# Patient Record
Sex: FEMALE | Race: BLACK OR AFRICAN AMERICAN | NOT HISPANIC OR LATINO | Employment: FULL TIME | URBAN - METROPOLITAN AREA
[De-identification: names, ages, dates, MRNs, and addresses within clinical notes are randomized per-mention and may not be internally consistent; named-entity substitution may affect disease eponyms.]

---

## 2022-05-13 ENCOUNTER — TELEPHONE (OUTPATIENT)
Dept: ORTHOPEDIC SURGERY | Facility: CLINIC | Age: 52
End: 2022-05-13

## 2022-05-13 NOTE — TELEPHONE ENCOUNTER
Called patient about missed appointment 5/12, patient stated she had another appointment and wasn't able to make this appointment, patient rescheduled. I reminded patient about our 24 hour cancellation notice.

## 2022-05-20 ENCOUNTER — TELEPHONE (OUTPATIENT)
Dept: ORTHOPEDIC SURGERY | Facility: CLINIC | Age: 52
End: 2022-05-20

## 2022-05-20 NOTE — TELEPHONE ENCOUNTER
Called patient about missed appointment 5/12, LVM and reminded patient about our 24 hour cancellation notice

## 2022-12-12 ENCOUNTER — TRANSCRIBE ORDERS (OUTPATIENT)
Dept: PHYSICAL THERAPY | Facility: CLINIC | Age: 52
End: 2022-12-12

## 2022-12-12 DIAGNOSIS — S39.012A STRAIN OF MUSCLE, FASCIA AND TENDON OF LOWER BACK, INITIAL ENCOUNTER: ICD-10-CM

## 2022-12-12 DIAGNOSIS — S46.011A STRAIN OF MUSCLE(S) AND TENDON(S) OF THE ROTATOR CUFF OF RIGHT SHOULDER, INITIAL ENCOUNTER: Primary | ICD-10-CM

## 2022-12-16 ENCOUNTER — TELEPHONE (OUTPATIENT)
Dept: PHYSICAL THERAPY | Facility: CLINIC | Age: 52
End: 2022-12-16

## 2022-12-21 ENCOUNTER — TREATMENT (OUTPATIENT)
Dept: PHYSICAL THERAPY | Facility: CLINIC | Age: 52
End: 2022-12-21

## 2022-12-21 DIAGNOSIS — M25.511 ACUTE PAIN OF RIGHT SHOULDER: Primary | ICD-10-CM

## 2022-12-21 DIAGNOSIS — M54.50 ACUTE RIGHT-SIDED LOW BACK PAIN WITHOUT SCIATICA: ICD-10-CM

## 2022-12-21 DIAGNOSIS — R29.898 WEAKNESS OF RIGHT ARM: ICD-10-CM

## 2022-12-21 PROCEDURE — 97014 ELECTRIC STIMULATION THERAPY: CPT | Performed by: PHYSICAL THERAPIST

## 2022-12-21 PROCEDURE — 97530 THERAPEUTIC ACTIVITIES: CPT | Performed by: PHYSICAL THERAPIST

## 2022-12-21 PROCEDURE — 97110 THERAPEUTIC EXERCISES: CPT | Performed by: PHYSICAL THERAPIST

## 2022-12-21 PROCEDURE — 97162 PT EVAL MOD COMPLEX 30 MIN: CPT | Performed by: PHYSICAL THERAPIST

## 2022-12-21 NOTE — PROGRESS NOTES
Physical Therapy Initial Evaluation and Plan of Care             [address]    [place] [address]    Patient: Dulce Painting   : 1970  Diagnosis/ICD-10 Code:  Acute pain of right shoulder [M25.511]  Referring practitioner: LACEY Kenney  Date of Initial Visit: 2022  Today's Date: 2022  Patient seen for 1 session         Visit Diagnoses:    ICD-10-CM ICD-9-CM   1. Acute pain of right shoulder  M25.511 719.41   2. Weakness of right arm  R29.898 729.89   3. Acute right-sided low back pain without sciatica  M54.50 724.2         Subjective Questionnaire: QuickDASH: 59.09% impairment      Subjective Evaluation    History of Present Illness  Date of onset: 2022  Mechanism of injury: Injured R low back and R shoulder at work when she attempted to assist a patient that was falling. Twisted and felt something snap. Was able to complete her shift and felt ok, but the next day developed pn. Denies any pn radiating down her leg but is experiencing tingling in her fingers that wakes her during the night, occurs during the day as well. R  weak. Unable to raise her R arm overhead,  has to assist w/ donning/doffing bra. Denies pn in neck or difficulty moving her head. Shoulder pn minimal at rest, primarily occurs when moving her arm.  Back pn intermittent, worst w/ quick movements. Gets a cramping sensation in the R low back at times and has to stretch it out by bending to her L side. Has not had any imaging.     Subjective comment: R shoulder pn, R low back pn  Patient Occupation: CNA   Precautions and Work Restrictions: light duty- lifting/pushing/pulling no more than 5-7 lbsQuality of life: fair    Pain  Current pain rating: 3  At best pain ratin  At worst pain rating: 10  Location: back current 3/10, best 0/10, worst 10/10  Quality: dull ache, sharp and radiating  Relieving factors: heat, change in position and rest  Aggravating factors: lifting, movement, overhead  "activity, standing, sleeping, prolonged positioning, squatting, repetitive movement, outstretched reach and ambulation  Progression: no change    Hand dominance: right    Diagnostic Tests  No diagnostic tests performed    Treatments  No previous or current treatments  Patient Goals  Patient goals for therapy: decreased pain, increased motion, increased strength, independence with ADLs/IADLs, return to sport/leisure activities and return to work             Treatment  See Exercise, Manual, and Modality Logs for complete treatment.     Objective          Observations     Right Shoulder  Negative for atrophy, deformity and edema.       Tenderness     Additional Tenderness Details  TTP R UT, post cuff, anterolateral subacromial region  TTP R QL, mid/lower lumbar paraspinals, glut med/max    Neurological Testing     Sensation   Cervical/Thoracic     Right   Diminished: light touch    Comments   Right light touch: R lateral forearm, thumb/index finger.     Lumbar     Right   Intact: light touch    Active Range of Motion   Cervical/Thoracic Spine   Normal active range of motion  Left Shoulder   Flexion: 150 degrees   Abduction: 150 degrees   External rotation 0°: 70 degrees   Internal rotation BTB: T8     Right Shoulder   Flexion: 80 degrees with pain  Abduction: 0 degrees with pain  External rotation 0°: 25 degrees with pain  Internal rotation BTB: Active internal rotation behind the back: unable to tolerate. with pain    Additional Active Range of Motion Details  Reports inc tingling in RUE w/ ipsilateral cervical rotation  Trunk Flxn fingertips to mid shin, \"pulling\" from post shoulder to R glut  Trunk Extn WFL-reports back pn relief  Trunk Rotation L WNL w/o pn  R unable to tolerate 2/2 R LBP  Trunk SB L WFL w/o pn   R WFL w/ R LBP       Passive Range of Motion     Right Shoulder   Flexion: 70 degrees   Abduction: 40 degrees   External rotation 0°: 30 degrees   Internal rotation 45°: 20 degrees     Strength/Myotome " Testing     Right Shoulder     Planes of Motion   Flexion: 2   Abduction: 2-   External rotation at 0°: 3   Internal rotation at 0°: 3     Right Elbow   Flexion: 3+  Extension: 4-    Left Wrist/Hand      (2nd hand position)   Left  strength (2nd hand position) 45 lbs    Right Wrist/Hand   Wrist extension: 4+  Wrist flexion: 4+     (2nd hand position)   Right  strength (2nd hand position) 18 lbs    Left Hip   Planes of Motion   Flexion: 4    Right Hip   Planes of Motion   Flexion: 3+    Left Knee   Flexion: 5  Extension: 5    Right Knee   Flexion: 5  Extension: 5    Left Ankle/Foot   Dorsiflexion: 5  Plantar flexion: 5  Great toe extension: 5    Right Ankle/Foot   Dorsiflexion: 5  Plantar flexion: 5  Great toe extension: 5    Additional Strength Details  Reported severe pn in R shoulder w/  testing    Tests     Right Shoulder   Positive drop arm, Hawkin's and Speed's.   Negative belly press and external rotation lag sign. Lag sign at 0 degrees: unable to susi positioning for test.     Lumbar     Right   Positive quadrant.   Negative passive SLR.     Additional Tests Details  (+) compression, spurling, distraction            Assessment & Plan     Assessment  Impairments: abnormal or restricted ROM, activity intolerance, impaired physical strength, lacks appropriate home exercise program and pain with function  Functional Limitations: carrying objects, lifting, sleeping, walking, pulling, pushing, uncomfortable because of pain, moving in bed, stooping, reaching behind back, reaching overhead and unable to perform repetitive tasks  Assessment details: Pt is a 52 YOF who presents to PT w/ complaint of R shoulder and R low back pn after work injury sustained on 12/7/22 while assisting a pt who was falling. Sings/symptoms indicative of R lumbar strain, potential R rotator cuff tear, though cervical discogenic pn cannot be excluded given report of UE paresthesia that is altered w/ cervical mobility,  compression/distraction. She exhibits profound loss of active R shoulder mobility and strength, virtually unable to initiate abduction. Demo (+) drop arm, (-) rotation lag signs though testing overall difficult to interpret given significantly high pn level, intolerance to some special testing. R  weak. She exhibits loss of trunk mobility w/ ipsilateral rotation and SB, noting pn and cramping sensation over R QL/paraspinals. (-) LE radicular symptoms and responded well to low back stretching in clinic. Good relief of shoulder pn today w/ tens/MHP. Pt would benefit from skilled PT services to address deficits and allow return to full work and daily activities w/o pn or limitation.    Barriers to therapy: high resting pn level, symptom irritability  Prognosis: good    Goals  Plan Goals: STG 4 wks  1) Pt to be compliant w/ initial HEP for ROM, strength and symptom mgmt.  2) Pt to report resting shoulder/low back pn to be no greater than 5/10.  3) Pt to improve R shoulder AROM to 120 deg fwd elevation, 90 deg abd, 50 deg ER or better to assist w/ ADL and work activity performance.  4) Pt to improve trunk mobility to 50% R rotation, sidebending or better.  5) Pt to improve QD score to 40% impairment or better to reflect improved pn and function.    LTG 8 wks  1) Pt to be independent w/ long term HEP and self mgmt.  2) Pt to report min to no shoulder/back pn w/ ADLs and work activities.  3) Pt to improve R shoulder AROM to 150 deg elevation, 70 deg ER or better.  4) Pt to improve R shoulder strength to 4/5 or better in all planes.  5) Pt to demo restored trunk mobility in all planes w/ little to no pn.  6) Pt to improve QD score to 20% impairment or better to reflect improved pn and function.       Plan  Therapy options: will be seen for skilled therapy services  Planned modality interventions: TENS, cryotherapy, thermotherapy (hydrocollator packs), ultrasound and dry needling  Planned therapy interventions:  flexibility, functional ROM exercises, home exercise program, joint mobilization, manual therapy, neuromuscular re-education, postural training, soft tissue mobilization, strengthening, stretching and therapeutic activities  Frequency: 2x week  Duration in weeks: 12  Treatment plan discussed with: patient  Plan details: TE/TA/NMR/MT to address noted deficits, modalities as indicated for pn control        History # of Personal Factors and/or Comorbidities: LOW (0)  Examination of Body System(s): # of elements: MODERATE (3)  Clinical Presentation: EVOLVING  Clinical Decision Making: MODERATE      Timed:         Manual Therapy:    0     mins  40072;     Therapeutic Exercise:    10     mins  85519;     Neuromuscular Adam:    0    mins  40400;    Therapeutic Activity:     10     mins  83697;     Gait Trainin     mins  11059;     Ultrasound:     0     mins  44438;    Ionto                               0    mins   87432  Self Care                       0     mins   33538  Canalith Repos    0     mins 01436      Un-Timed:  Electrical Stimulation:    15     mins  11443 ( );  Dry Needling     0     mins self-pay  Traction     0     mins 93763  Low Eval     0     Mins  50444  Mod Eval     30     Mins  14152  High Eval                       0     Mins  27355        Timed Treatment:   20   mins   Total Treatment:     65   mins          PT: Diana See PT     [place] License: #[id number]    Electronically signed by Diana See, PT, 22, 1:05 PM EST    Certification Period: 2022 thru 3/20/2023  I certify that the therapy services are furnished while this patient is under my care.  The services outlined above are required by this patient, and will be reviewed every 90 days.         Physician Signature:__________________________________________________    PHYSICIAN: Sada, LACEY Alvarez      DATE:     Please sign and return via fax to [phone]. Thank you, [facility].

## 2022-12-28 ENCOUNTER — TREATMENT (OUTPATIENT)
Dept: PHYSICAL THERAPY | Facility: CLINIC | Age: 52
End: 2022-12-28

## 2022-12-28 DIAGNOSIS — M25.511 ACUTE PAIN OF RIGHT SHOULDER: Primary | ICD-10-CM

## 2022-12-28 DIAGNOSIS — M54.50 ACUTE RIGHT-SIDED LOW BACK PAIN WITHOUT SCIATICA: ICD-10-CM

## 2022-12-28 DIAGNOSIS — R29.898 WEAKNESS OF RIGHT ARM: ICD-10-CM

## 2022-12-28 PROCEDURE — 97530 THERAPEUTIC ACTIVITIES: CPT | Performed by: PHYSICAL THERAPIST

## 2022-12-28 PROCEDURE — 97140 MANUAL THERAPY 1/> REGIONS: CPT | Performed by: PHYSICAL THERAPIST

## 2022-12-28 PROCEDURE — 97110 THERAPEUTIC EXERCISES: CPT | Performed by: PHYSICAL THERAPIST

## 2022-12-28 NOTE — PROGRESS NOTES
"Physical Therapy Daily Treatment Note  [address] [facility] [address] [facility] [address]   [place] [address]      Patient: Dulce Painting   : 1970  Referring practitioner: LACEY Kenney  Date of Initial Visit: Type: THERAPY  Noted: 2022  Today's Date: 2022  Patient seen for 2 sessions       Visit Diagnoses:    ICD-10-CM ICD-9-CM   1. Acute pain of right shoulder  M25.511 719.41   2. Weakness of right arm  R29.898 729.89   3. Acute right-sided low back pain without sciatica  M54.50 724.2       Subjective   Dulce Painting reports: \"My arm is still tingling bad, and it's been popping a lot. It's better than where it was. The back is a different story. I can bend more but it hurts so bad.\"    Pre tx pn score: 8  Post tx pn score: 7    Treatment  See Exercise, Manual, and Modality Logs for complete treatment.     Objective     Right Shoulder   Flexion: 90 degrees with pain  Abduction: 65 degrees with pain  External rotation 0°: 75 degrees with pain  Internal rotation BTB: Active internal rotation behind the back: unable to tolerate. with pain    Assessment & Plan     Assessment    Assessment details: Pt compliant w/ initial HEP and subjectively reports some improvement in shoulder pn. Demonstrates gains in active shoulder mobility in all planes but IR BTB. Paresthesia persists, but nearly abolished w/ manual cervical traction and maintained for duration of visit. Focused on graded mobility exercise to improve tolerance to trunk ROM, posterior chain flexibility. No issues reported w/ hamstring stretching but all nearly other trunk movements painful even when performed in limited excursion. Had good susi to seated pelvic tilts so discussed adding to home. Encouraged continued PROM of the shoulder as instructed w/ HEP, avoiding excessive use of the R arm that exacerbates symptoms. She verbalized understanding. Pt needs continued PT to restore full strength, ROM, and function in order to allow return to full work and daily " activities w/o pn or dysfunction.      Plan  Plan details: Cont w/ focus on regaining trunk/shoulder mobility, reducing pn; may try NuStep for LE mobility          Timed:         Manual Therapy:    10     mins  59960;     Therapeutic Exercise:    25     mins  61105;     Neuromuscular Adam:    0    mins  26265;    Therapeutic Activity:     8     mins  27260;     Gait Trainin     mins  45757;     Ultrasound:     0     mins  29262;    Ionto                               0    mins   56568  Self Care                       0     mins   63530  Canalith Repos    0     mins 25645      Un-Timed:  Electrical Stimulation:    0     mins  23690 ( );  Dry Needling     0     mins self-pay  Traction     0     mins 64817      Timed Treatment:   43   mins   Total Treatment:     43   mins    Diana See, PT  [place] License: #[id number]

## 2023-01-05 ENCOUNTER — TREATMENT (OUTPATIENT)
Dept: PHYSICAL THERAPY | Facility: CLINIC | Age: 53
End: 2023-01-05
Payer: OTHER MISCELLANEOUS

## 2023-01-05 DIAGNOSIS — M25.511 ACUTE PAIN OF RIGHT SHOULDER: Primary | ICD-10-CM

## 2023-01-05 DIAGNOSIS — R29.898 WEAKNESS OF RIGHT ARM: ICD-10-CM

## 2023-01-05 DIAGNOSIS — M54.50 ACUTE RIGHT-SIDED LOW BACK PAIN WITHOUT SCIATICA: ICD-10-CM

## 2023-01-05 PROCEDURE — 97112 NEUROMUSCULAR REEDUCATION: CPT | Performed by: PHYSICAL THERAPIST

## 2023-01-05 PROCEDURE — 97530 THERAPEUTIC ACTIVITIES: CPT | Performed by: PHYSICAL THERAPIST

## 2023-01-05 PROCEDURE — 97110 THERAPEUTIC EXERCISES: CPT | Performed by: PHYSICAL THERAPIST

## 2023-01-05 NOTE — PROGRESS NOTES
Physical Therapy Daily Treatment Note  [address] [facility]  [address]   [place] [address]      Patient: Dulce Painting   : 1970  Referring practitioner: LACEY Kenney  Date of Initial Visit: Type: THERAPY  Noted: 2022  Today's Date: 2023  Patient seen for 3 sessions       Visit Diagnoses:    ICD-10-CM ICD-9-CM   1. Acute pain of right shoulder  M25.511 719.41   2. Weakness of right arm  R29.898 729.89   3. Acute right-sided low back pain without sciatica  M54.50 724.2       Subjective   Dulce Painting reports: \"I can move my arm behind my back a little more but it's still painful. My shoulder has been popping a lot and my arm is still tingling off and on. My back seems to be doing better.\"    Pre tx pn score: 6  Post tx pn score: 6    Treatment  See Exercise, Manual, and Modality Logs for complete treatment.     Objective     Active shoulder elevation approx 90 deg    Assessment & Plan     Assessment    Assessment details: Cont to c/o severe R shoulder pn, popping w/ mobility, and intermittent tingling down the arm to level of wrist. No lasting relief w/ cervical traction. Symptoms concerning for cuff tear. Bicep contraction palpable, (-) sixto deformity. Very TTP in region of subscapularis and reports axillary and lateral shoulder pn w/ virtually all shoulder exercise. Has minimal discomfort or limitation w/ active ER. Elevation ROM unchanged. Back symptoms minimal, managed w/ stretching. Pt to f/u w/ referring provider Monday regarding ongoing R shoulder pn.    Plan  Plan details: Cont per MD recommendation          Timed:         Manual Therapy:    0     mins  98536;     Therapeutic Exercise:    15     mins  49290;     Neuromuscular Adam:    8    mins  88931;    Therapeutic Activity:     10     mins  87464;     Gait Trainin     mins  93478;     Ultrasound:     0     mins  72774;    Ionto                               0    mins   04630  Self Care                       0     mins    15439  Canalith Repos    0     mins 50229      Un-Timed:  Electrical Stimulation:    0     mins  92926 ( );  Dry Needling     0     mins self-pay  Traction     0     mins 19992      Timed Treatment:   33   mins   Total Treatment:     33   mins    Diana See, PT  [place] License: #[id number]

## 2023-01-23 ENCOUNTER — TELEPHONE (OUTPATIENT)
Dept: PHYSICAL THERAPY | Facility: OTHER | Age: 53
End: 2023-01-23

## 2023-01-30 ENCOUNTER — TREATMENT (OUTPATIENT)
Dept: PHYSICAL THERAPY | Facility: CLINIC | Age: 53
End: 2023-01-30
Payer: OTHER MISCELLANEOUS

## 2023-01-30 DIAGNOSIS — R29.898 WEAKNESS OF RIGHT ARM: ICD-10-CM

## 2023-01-30 DIAGNOSIS — M54.50 ACUTE RIGHT-SIDED LOW BACK PAIN WITHOUT SCIATICA: ICD-10-CM

## 2023-01-30 DIAGNOSIS — M25.511 ACUTE PAIN OF RIGHT SHOULDER: Primary | ICD-10-CM

## 2023-01-30 PROCEDURE — 97530 THERAPEUTIC ACTIVITIES: CPT | Performed by: PHYSICAL THERAPIST

## 2023-01-30 PROCEDURE — 97110 THERAPEUTIC EXERCISES: CPT | Performed by: PHYSICAL THERAPIST

## 2023-01-30 PROCEDURE — 97112 NEUROMUSCULAR REEDUCATION: CPT | Performed by: PHYSICAL THERAPIST

## 2023-01-30 NOTE — PROGRESS NOTES
Physical Therapy Reassessment  [address] [facility] [address]    [place] [address]  Patient: Dulce Painting   : 1970  Diagnosis/ICD-10 Code:  Acute pain of right shoulder [M25.511]  Referring practitioner: LACEY Kenney  Date of Initial Visit: 2023  Today's Date: 2023  Patient seen for 4 sessions         Visit Diagnoses:    ICD-10-CM ICD-9-CM   1. Acute pain of right shoulder  M25.511 719.41   2. Weakness of right arm  R29.898 729.89   3. Acute right-sided low back pain without sciatica  M54.50 724.2       Subjective Questionnaire: QuickDASH: 56.82% impairment  Clinical Progress: improved  Home Program Compliance: Yes  Treatment has included: therapeutic exercise, neuromuscular re-education, manual therapy and therapeutic activity      Subjective   Dulce Painting reports: Pn in shoulder much better. Virtually no pn at rest but still painful to move arm overhead or attempt reaching behind her back. Fastening bra difficult. Tingling present throughout RUE off/on throughout the day, random in onset. Back is doing fairly well. Toward the end of the day can be difficult to move but tolerates modified work duties ok. Still limiting bending and lifting more than 10 lbs .Follows up w/ MD again this Friday.     Pre tx pn score: 0  Post tx pn score: 2    Treatment  See Exercise, Manual, and Modality Logs for complete treatment.     Objective          Observations     Right Shoulder  Negative for atrophy, deformity and edema.       Tenderness     Additional Tenderness Details  TTP post cuff, subscap, ant GHJ  TTP R QL, mid/lower lumbar paraspinals    Neurological Testing     Sensation   Cervical/Thoracic     Right   Diminished: light touch    Comments   Right light touch: R lateral forearm, thumb/index finger.     Lumbar     Right   Intact: light touch    Active Range of Motion   Cervical/Thoracic Spine   Normal active range of motion  Left Shoulder   Flexion: 150 degrees   Abduction: 150 degrees   External  rotation 0°: 70 degrees   Internal rotation BTB: T8     Right Shoulder   Flexion: 140 degrees with pain  Abduction: 80 degrees with pain  External rotation 0°: 75 degrees with pain  Internal rotation BTB: L1 with pain    Additional Active Range of Motion Details  Trunk Flxn fingertips to distal shin w/o pn  Trunk Extn WFL-reports back pn relief  Trunk Rotation WNL bilaterally w/o pn; mild contralateral low back tightness to L>R  Trunk SB WNL bilaterally w/ midline LBP at end range R SB      Passive Range of Motion     Right Shoulder   Flexion: 140 degrees   Abduction: 90 degrees   External rotation 0°: 80 degrees   External rotation 45°: 30 degrees   Internal rotation 45°: 45 degrees     Additional Passive Range of Motion Details  All PROM pn limited    Strength/Myotome Testing     Right Shoulder     Planes of Motion   Flexion: 3-   Abduction: 2+   External rotation at 0°: 4-   Internal rotation at 0°: 3+     Right Elbow   Flexion: 3+  Extension: 4-    Left Wrist/Hand      (2nd hand position)   Left  strength (2nd hand position) 45 lbs    Right Wrist/Hand   Wrist extension: 4+  Wrist flexion: 4+     (2nd hand position)   Right  strength (2nd hand position) 18 lbs    Left Hip   Planes of Motion   Flexion: 4    Right Hip   Planes of Motion   Flexion: 3+    Left Knee   Flexion: 5  Extension: 5    Right Knee   Flexion: 5  Extension: 5    Left Ankle/Foot   Dorsiflexion: 5  Plantar flexion: 5  Great toe extension: 5    Right Ankle/Foot   Dorsiflexion: 5  Plantar flexion: 5  Great toe extension: 5    Additional Strength Details  Reported severe pn in R shoulder w/  testing    Tests     Right Shoulder   Positive drop arm, Hawkin's and Speed's.   Negative belly press and external rotation lag sign. Lag sign at 0 degrees: unable to susi positioning for test.     Lumbar     Right   Positive quadrant.   Negative passive SLR.             Assessment & Plan     Assessment    Assessment details: Reassessment  performed. Pt has completed 4 PT visits. Demo significant gains in L R shoulder mobility, trunk mobility, smaller gains in R shoulder strength. Overall shoulder and back pn improved. Chief complaints at this time are weak and painful R shoulder mobility, intermittent paresthesia of the RUE that limit ability to perform work and daily activities. Findings remain concerning w/ cuff injury. Follows up w/ referring provider at end of the week. May benefit from ongoing PT to restore full strength, ROM, and function in order to allow return to full work and daily activities w/o pn or dysfunction.      Goals  Plan Goals: STG 4 wks  1) Pt to be compliant w/ initial HEP for ROM, strength and symptom mgmt.-MET  2) Pt to report resting shoulder/low back pn to be no greater than 5/10.-MET  3) Pt to improve R shoulder AROM to 120 deg fwd elevation, 90 deg abd, 50 deg ER or better to assist w/ ADL and work activity performance.-PARTIALLY MET  4) Pt to improve trunk mobility to 50% R rotation, sidebending or better.-MET  5) Pt to improve QD score to 40% impairment or better to reflect improved pn and function.-ONGOING    LTG 8 wks  1) Pt to be independent w/ long term HEP and self mgmt.-ONGOING  2) Pt to report min to no shoulder/back pn w/ ADLs and work activities.-ONGOING  3) Pt to improve R shoulder AROM to 150 deg elevation, 70 deg ER or better.-PROGRESSING  4) Pt to improve R shoulder strength to 4/5 or better in all planes.-PROGRESSING  5) Pt to demo restored trunk mobility in all planes w/ little to no pn.-PARTIALLY MET  6) Pt to improve QD score to 20% impairment or better to reflect improved pn and function. -ONGOING    Plan  Plan details: Cont w/ focus on restoration of R shoulder mobility, cuff/scapular function, low back stretching, core stab as tolerated         Progress toward previous goals: Partially Met        Timed:         Manual Therapy:    0     mins  43874;     Therapeutic Exercise:    15     mins  86055;      Neuromuscular Adam:    10    mins  24471;    Therapeutic Activity:     20     mins  45021;     Gait Trainin     mins  09404;     Ultrasound:     0     mins  38325;    Ionto                               0    mins   06710  Self Care                       0     mins   44611  Canalith Repos    0     mins 61912      Un-Timed:  Electrical Stimulation:    0     mins  31178 ( );  Dry Needling     0     mins self-pay  Traction     0     mins 49388  Re-Eval                           0    mins  48809      Timed Treatment:   45   mins   Total Treatment:     45   mins          PT: Diana See PT     [place] License: #[id number]    Electronically signed by Diana See, PT, 23, 3:23 PM EST

## 2023-01-31 NOTE — PATIENT INSTRUCTIONS
Access Code: [email or web address]  URL: [email or web address]  Date: 01/31/2023  Prepared by: Diana See    Exercises  Standing Shoulder and Trunk Flexion at Table - 1-2 x daily - 1-2 sets - 10 reps  Standing Single Arm Elbow Flexion with Resistance - 1-2 x daily - 1-2 sets - 10 reps  Seated Shoulder Flexion Towel Slide at Table Top - 1-2 x daily - 1-2 sets - 10 reps  Shoulder External Rotation and Scapular Retraction with Resistance - 1-2 x daily - 1-2 sets - 10 reps  Standing Lumbar Extension - 1-2 x daily - 1-2 sets - 10 reps  Supine Lower Trunk Rotation - 1-2 x daily - 5 reps - 10 sec hold

## 2023-02-06 ENCOUNTER — TREATMENT (OUTPATIENT)
Dept: PHYSICAL THERAPY | Facility: CLINIC | Age: 53
End: 2023-02-06
Payer: OTHER MISCELLANEOUS

## 2023-02-06 DIAGNOSIS — R29.898 WEAKNESS OF RIGHT ARM: ICD-10-CM

## 2023-02-06 DIAGNOSIS — M25.511 ACUTE PAIN OF RIGHT SHOULDER: Primary | ICD-10-CM

## 2023-02-06 DIAGNOSIS — M54.50 ACUTE RIGHT-SIDED LOW BACK PAIN WITHOUT SCIATICA: ICD-10-CM

## 2023-02-06 PROCEDURE — 97110 THERAPEUTIC EXERCISES: CPT | Performed by: PHYSICAL THERAPIST

## 2023-02-06 NOTE — PROGRESS NOTES
Physical Therapy Daily Treatment Note                  [address] [facility] [address]  [place] [address]      Patient: Dulce Painting   : 1970  Diagnosis/ICD-10 Code:  Acute pain of right shoulder [M25.511]  Referring practitioner: No ref. provider found  Date of Initial Visit: Type: THERAPY  Noted: 2022  Today's Date: 2023  Patient seen for 5 sessions             Subjective   Dulce Painting reports: felt like she was doing better with the exercises given last visit but then overdid it at home cleaning windows and other tasks. The shoulder has felt very tired, painful and tingling at times. Got the days wrong and follow up with MD is tomorrow.     Objective   See Exercise, Manual, and Modality Logs for complete treatment.       Assessment/Plan  N/T symptoms come on easily with mobility exercises unless given rest breaks. Follow up with MD tomorrow for further consultation on her care.   Progress per Plan of Care, Progress strengthening /stabilization /functional activity and Awaiting MD orders           Timed:  Manual Therapy:         mins  43162;  Therapeutic Exercise:    30     mins  94759;     Neuromuscular Adam:        mins  76579;    Therapeutic Activity:          mins  67345;     Gait Training:           mins  15421;     Ultrasound:          mins  30016;    Electrical Stimulation:         mins  78633;  Iontophoresis         mins  29183    Untimed:  Electrical Stimulation:         mins  45106 ( );  Mechanical Traction:         mins  30380;   Fluidotherapy          mins  19194    Timed Treatment:   30   mins   Total Treatment:     30   mins        Diana Oropeza PTA  Physical Therapist Assistant

## 2023-02-09 ENCOUNTER — TRANSCRIBE ORDERS (OUTPATIENT)
Dept: ADMINISTRATIVE | Facility: HOSPITAL | Age: 53
End: 2023-02-09
Payer: COMMERCIAL

## 2023-02-09 DIAGNOSIS — S46.011A STRAIN OF MUSCLE(S) AND TENDON(S) OF THE ROTATOR CUFF OF RIGHT SHOULDER, INITIAL ENCOUNTER: Primary | ICD-10-CM

## 2023-02-13 ENCOUNTER — TREATMENT (OUTPATIENT)
Dept: PHYSICAL THERAPY | Facility: CLINIC | Age: 53
End: 2023-02-13
Payer: OTHER MISCELLANEOUS

## 2023-02-13 DIAGNOSIS — M54.50 ACUTE RIGHT-SIDED LOW BACK PAIN WITHOUT SCIATICA: ICD-10-CM

## 2023-02-13 DIAGNOSIS — R29.898 WEAKNESS OF RIGHT ARM: ICD-10-CM

## 2023-02-13 DIAGNOSIS — M25.511 ACUTE PAIN OF RIGHT SHOULDER: Primary | ICD-10-CM

## 2023-02-13 PROCEDURE — 97014 ELECTRIC STIMULATION THERAPY: CPT | Performed by: PHYSICAL THERAPIST

## 2023-02-13 PROCEDURE — 97110 THERAPEUTIC EXERCISES: CPT | Performed by: PHYSICAL THERAPIST

## 2023-02-13 NOTE — PROGRESS NOTES
Physical Therapy Daily Treatment Note                  [address] [facility] [address]  [place] [address]      Patient: Dulce Painting   : 1970  Diagnosis/ICD-10 Code:  Acute pain of right shoulder [M25.511]  Referring practitioner: No ref. provider found  Date of Initial Visit: Type: THERAPY  Noted: 2022  Today's Date: 2023  Patient seen for 6 sessions             Subjective   Dulce Painting reports: still not lifting a lot due to restrictions. Really hurting in the back of the shoulder blade today with increase tingling. Thinks she slept on her shoulder wrong last night. MRI is scheduled over a month out and doesn't know if she wants to wait that long.     Objective   TENDER/TRIGGER POINT: R infraspinatus  See Exercise, Manual, and Modality Logs for complete treatment.       Assessment/Plan  Pt was apprehensive with R shoulder blade and could not handle any type of STM today. Completed modalities at end, which did decrease tingling but minimal change on pain. Pt may benefit from manual treatment next visit if still irritated from lying on R shoulder.   Progress per Plan of Care and Progress strengthening /stabilization /functional activity           Timed:  Manual Therapy:         mins  92477;  Therapeutic Exercise:    30     mins  39709;     Neuromuscular Adam:        mins  03802;    Therapeutic Activity:          mins  18972;     Gait Training:           mins  92016;     Ultrasound:          mins  56155;    Electrical Stimulation:         mins  26942;  Iontophoresis          mins  20751    Untimed:  Electrical Stimulation:    15     mins  74261 ( );  Mechanical Traction:         mins  71731;   Fluidotherapy          mins  79013    Timed Treatment:   30   mins   Total Treatment:     45   mins        Diana Oropeza PTA  Physical Therapist Assistant

## 2023-02-15 ENCOUNTER — TREATMENT (OUTPATIENT)
Dept: PHYSICAL THERAPY | Facility: CLINIC | Age: 53
End: 2023-02-15
Payer: OTHER MISCELLANEOUS

## 2023-02-15 DIAGNOSIS — M25.511 ACUTE PAIN OF RIGHT SHOULDER: Primary | ICD-10-CM

## 2023-02-15 DIAGNOSIS — M54.50 ACUTE RIGHT-SIDED LOW BACK PAIN WITHOUT SCIATICA: ICD-10-CM

## 2023-02-15 DIAGNOSIS — R29.898 WEAKNESS OF RIGHT ARM: ICD-10-CM

## 2023-02-15 PROCEDURE — 97035 APP MDLTY 1+ULTRASOUND EA 15: CPT | Performed by: PHYSICAL THERAPIST

## 2023-02-15 PROCEDURE — 97014 ELECTRIC STIMULATION THERAPY: CPT | Performed by: PHYSICAL THERAPIST

## 2023-02-15 PROCEDURE — 97140 MANUAL THERAPY 1/> REGIONS: CPT | Performed by: PHYSICAL THERAPIST

## 2023-02-15 NOTE — PROGRESS NOTES
Physical Therapy Daily Treatment Note                  [address] [facility] [address]  [place] [address]      Patient: Dulce Painting   : 1970  Diagnosis/ICD-10 Code:  Acute pain of right shoulder [M25.511]  Referring practitioner: No ref. provider found  Date of Initial Visit: Type: THERAPY  Noted: 2022  Today's Date: 2/15/2023  Patient seen for 7 sessions             Subjective   Dulce Painting reports: the back pan is pretty much resolved. The right shoulder is not as painful as last visit but the tingling is present down the arm to the hand. Unable to get MRI until approved.     Objective          Palpation     Right   Hypertonic in the infraspinatus. Tenderness of the infraspinatus.   Trigger point to infraspinatus.      See Exercise, Manual, and Modality Logs for complete treatment.       Assessment/Plan  Multiple trigger points that reproduced tingling in arm when palpating the infraspinatus. She was able to tolerate light pressure to massage these points that followed a decrease in radicular symptoms. Tingling centralized to R shoulder blade and minimal pain complaints. Ended with modalities to settle the symptoms today instead of stretching/strengthening. Hope to add this back in next visit.   Progress per Plan of Care and Progress strengthening /stabilization /functional activity           Timed:  Manual Therapy:    14     mins  41438;  Therapeutic Exercise:         mins  57665;     Neuromuscular Adam:        mins  81420;    Therapeutic Activity:          mins  93506;     Gait Training:           mins  63138;     Ultrasound:     10     mins  49521;    Electrical Stimulation:         mins  28087;  Iontophoresis          mins  34244    Untimed:  Electrical Stimulation:    20     mins  43736 ( );  Mechanical Traction:         mins  84572;   Fluidotherapy          mins  98058    Timed Treatment:   24   mins   Total Treatment:     44   mins        Diana Oropeza  PTA  Physical Therapist Assistant

## 2023-02-20 ENCOUNTER — TELEPHONE (OUTPATIENT)
Dept: PHYSICAL THERAPY | Facility: CLINIC | Age: 53
End: 2023-02-20

## 2023-02-22 ENCOUNTER — TREATMENT (OUTPATIENT)
Dept: PHYSICAL THERAPY | Facility: CLINIC | Age: 53
End: 2023-02-22
Payer: OTHER MISCELLANEOUS

## 2023-02-22 DIAGNOSIS — M54.50 ACUTE RIGHT-SIDED LOW BACK PAIN WITHOUT SCIATICA: ICD-10-CM

## 2023-02-22 DIAGNOSIS — M25.511 ACUTE PAIN OF RIGHT SHOULDER: Primary | ICD-10-CM

## 2023-02-22 DIAGNOSIS — R29.898 WEAKNESS OF RIGHT ARM: ICD-10-CM

## 2023-02-22 PROCEDURE — 97140 MANUAL THERAPY 1/> REGIONS: CPT | Performed by: PHYSICAL THERAPIST

## 2023-02-22 PROCEDURE — 97110 THERAPEUTIC EXERCISES: CPT | Performed by: PHYSICAL THERAPIST

## 2023-02-22 PROCEDURE — 97112 NEUROMUSCULAR REEDUCATION: CPT | Performed by: PHYSICAL THERAPIST

## 2023-02-22 PROCEDURE — 97530 THERAPEUTIC ACTIVITIES: CPT | Performed by: PHYSICAL THERAPIST

## 2023-02-22 NOTE — PROGRESS NOTES
"Physical Therapy Reassessment  [address] [facility] [address]    [place] [address]  Patient: Dulce Painting   : 1970  Diagnosis/ICD-10 Code:  Acute pain of right shoulder [M25.511]  Referring practitioner: No ref. provider found  Date of Initial Visit: 2023  Today's Date: 2023  Patient seen for 8 sessions         Visit Diagnoses:    ICD-10-CM ICD-9-CM   1. Acute pain of right shoulder  M25.511 719.41   2. Weakness of right arm  R29.898 729.89   3. Acute right-sided low back pain without sciatica  M54.50 724.2       Subjective Questionnaire: QuickDASH: 72.73% impairment  Clinical Progress: improved  Home Program Compliance: Yes  Treatment has included: therapeutic exercise, neuromuscular re-education, manual therapy, therapeutic activity, electrical stimulation, ultrasound, moist heat and cryotherapy      Subjective   Dulce Painting reports: Reports some improvement in tingling, not as constant. Pn diminished some. ROM improving but arm feels weak. Shoulder pn primarily posterior. \"My back is doing excellent. It doesn't hurt me to bend. I don't do anything fast.\"    Pre tx pn score: 2  Post tx pn score: 1    Treatment  See Exercise, Manual, and Modality Logs for complete treatment.     Objective          Observations     Right Shoulder  Negative for atrophy, deformity and edema.       Tenderness     Additional Tenderness Details  TTP supra/infraspinatus  No TTP lumbar spine    Neurological Testing     Sensation   Cervical/Thoracic     Right   Diminished: light touch    Comments   Right light touch: R lateral forearm, thumb/index finger.     Lumbar     Right   Intact: light touch    Active Range of Motion   Cervical/Thoracic Spine   Normal active range of motion  Left Shoulder   Flexion: 150 degrees   Abduction: 150 degrees   External rotation 0°: 70 degrees   Internal rotation BTB: T8     Right Shoulder   Flexion: 128 degrees with pain  Abduction: 115 degrees with pain  External rotation 0°: 90 degrees "   Internal rotation BTB: T11 with pain    Additional Active Range of Motion Details  Inc tingling w/ ER to end range  Trunk mobility WFL and pn free      Passive Range of Motion     Right Shoulder   Flexion: 150 degrees   Abduction: 120 degrees   External rotation 0°: 90 degrees   External rotation 45°: 30 degrees   Internal rotation 45°: 45 degrees     Additional Passive Range of Motion Details  All PROM pn limited    Strength/Myotome Testing     Right Shoulder     Planes of Motion   Flexion: 4- (in available ROM)   Abduction: 3+ (in available ROM)   External rotation at 0°: 4+   Internal rotation at 0°: 4     Right Elbow   Flexion: 4+  Extension: 4+    Left Wrist/Hand      (2nd hand position)   Left  strength (2nd hand position) 45 lbs    Right Wrist/Hand   Wrist extension: 4+  Wrist flexion: 4+     (2nd hand position)   Right  strength (2nd hand position) 25 lbs    Left Hip   Planes of Motion   Flexion: 4    Right Hip   Planes of Motion   Flexion: 4    Left Knee   Flexion: 5  Extension: 5    Right Knee   Flexion: 5  Extension: 5    Left Ankle/Foot   Dorsiflexion: 5  Plantar flexion: 5  Great toe extension: 5    Right Ankle/Foot   Dorsiflexion: 5  Plantar flexion: 5  Great toe extension: 5    Additional Strength Details  No pn in R shoulder w/  testing    Tests     Right Shoulder   Positive full can, Hawkin's and Neer's.   Negative belly press, drop arm, external rotation lag sign, internal rotation lag sign and Speed's.     Lumbar     Right   Negative passive SLR and quadrant.             Assessment & Plan     Assessment    Assessment details: Reassessment performed. Pt has completed 8 PT visits. She subjectively reports near resolution of back pn, significnat improvement in R shoulder pn, decreased freq/intensity of UE paresthesia, and improved function w/ daily/work activities. She demonstrates significant gains in active/passive shoulder mobility and shoulder strength. Cont to c/o isolated  post shoulder pn that is aggravated by shoulder movement and repetitive use of arm. Very TTP at distal supra/infraspinatus insertion. Passive mobility nearly restored. Weak w/ elevation, but rotation strength good. Pt is making good progress toward PT goals and needs continued PT to restore full strength, ROM, and function in order to allow return to full work and daily activities w/o pn or dysfunction.      Goals  Plan Goals: STG 4 wks  1) Pt to be compliant w/ initial HEP for ROM, strength and symptom mgmt.-MET  2) Pt to report resting shoulder/low back pn to be no greater than 5/10.-MET  3) Pt to improve R shoulder AROM to 120 deg fwd elevation, 90 deg abd, 50 deg ER or better to assist w/ ADL and work activity performance.-MET  4) Pt to improve trunk mobility to 50% R rotation, sidebending or better.-MET  5) Pt to improve QD score to 40% impairment or better to reflect improved pn and function.-ONGOING    LTG 8 wks  1) Pt to be independent w/ long term HEP and self mgmt.-ONGOING  2) Pt to report min to no shoulder/back pn w/ ADLs and work activities.-PARTIALLY MET (BACK)  3) Pt to improve R shoulder AROM to 150 deg elevation, 70 deg ER or better.-PARTIALLY MET  4) Pt to improve R shoulder strength to 4/5 or better in all planes.-PARTIALLY MET  5) Pt to demo restored trunk mobility in all planes w/ little to no pn.-MET  6) Pt to improve QD score to 20% impairment or better to reflect improved pn and function. -ONGOING      Plan  Plan details: Cont w/ focus on restoration of pn free shoulder mobility, cuff/scapular strength, neurodynamics (radial n.), progressing to work simulated activity as tolerated        Progress toward previous goals: Partially Met        Timed:         Manual Therapy:    15     mins  02146;     Therapeutic Exercise:    20     mins  55807;     Neuromuscular Adam:    8    mins  25315;    Therapeutic Activity:     10     mins  99785;     Gait Trainin     mins  38708;     Ultrasound:      10     mins  22553;    Ionto                               0    mins   11036  Self Care                       0     mins   95554  Canalith Repos    0     mins 55851      Un-Timed:  Electrical Stimulation:    0     mins  18888 ( );  Dry Needling     0     mins self-pay  Traction     0     mins 87396  Re-Eval                           0    mins  15477      Timed Treatment:   63   mins   Total Treatment:     63   mins          PT: Diana See, PT     [place] License: #[id number]    Electronically signed by Diana See, PT, 02/22/23, 3:27 PM EST

## 2023-03-06 ENCOUNTER — TREATMENT (OUTPATIENT)
Dept: PHYSICAL THERAPY | Facility: CLINIC | Age: 53
End: 2023-03-06
Payer: OTHER MISCELLANEOUS

## 2023-03-06 DIAGNOSIS — R29.898 WEAKNESS OF RIGHT ARM: ICD-10-CM

## 2023-03-06 DIAGNOSIS — M25.511 ACUTE PAIN OF RIGHT SHOULDER: Primary | ICD-10-CM

## 2023-03-06 DIAGNOSIS — M54.50 ACUTE RIGHT-SIDED LOW BACK PAIN WITHOUT SCIATICA: ICD-10-CM

## 2023-03-06 PROCEDURE — 97014 ELECTRIC STIMULATION THERAPY: CPT | Performed by: PHYSICAL THERAPIST

## 2023-03-06 NOTE — PROGRESS NOTES
"Physical Therapy Daily Treatment Note      Patient: Dulce Painting   : 1970  Referring practitioner: LACEY Kenney  Date of Initial Visit: Type: THERAPY  Noted: 2022  Today's Date: 3/6/2023  Patient seen for 9 sessions       Visit Diagnoses:    ICD-10-CM ICD-9-CM   1. Acute pain of right shoulder  M25.511 719.41   2. Acute right-sided low back pain without sciatica  M54.50 724.2   3. Weakness of right arm  R29.898 729.89       Subjective Patient reports having increased right arm symptoms with intermittent tingling over past days.  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  Reports limited on time available to participate today due to family appointment/committment.      Pre Tx Pn: 6/10  Post Tx Pn:     Objective   See Exercise, Manual, and Modality Logs for complete treatment.       Assessment/Plan  Patient treatment today limited due to time constraints.  Pulleys completed to initiate E however c/o pain leading to completion of E-stim modality with time available.  Patient plans to attend full session later this week and will benefit from continued treatment for overall progression of function as able within therapy sessions.      Plan:  Continue with intervention per initial POC      Timed:         Manual Therapy:    0     mins  22930;     Therapeutic Exercise:    5     mins  45822;     Neuromuscular Adam:    0    mins  35399;    Therapeutic Activity:     0     mins  56975;     Gait Trainin     mins  25158;     Ultrasound:     0     mins  41599;    Ionto                               0    mins   53669  Self Care                       0     mins   40359  Canalith Repos    0     mins 22319      Un-Timed:  Electrical Stimulation:    20     mins  42259 ( );  Dry Needling     0     mins self-pay  Traction     0     mins 39108      Timed Treatment:   5   mins   Total Treatment:     25   mins    James Pratt PT, DPT  [place] License: " [id number]

## 2023-03-27 ENCOUNTER — HOSPITAL ENCOUNTER (OUTPATIENT)
Dept: MRI IMAGING | Facility: HOSPITAL | Age: 53
Discharge: HOME | End: 2023-03-27
Admitting: NURSE PRACTITIONER
Payer: OTHER MISCELLANEOUS

## 2023-03-27 DIAGNOSIS — S46.011A STRAIN OF MUSCLE(S) AND TENDON(S) OF THE ROTATOR CUFF OF RIGHT SHOULDER, INITIAL ENCOUNTER: ICD-10-CM

## 2023-03-27 PROCEDURE — 73221 MRI JOINT UPR EXTREM W/O DYE: CPT

## 2023-04-06 ENCOUNTER — TELEPHONE (OUTPATIENT)
Dept: PHYSICAL THERAPY | Facility: CLINIC | Age: 53
End: 2023-04-06

## 2023-04-06 NOTE — TELEPHONE ENCOUNTER
Caller: Dulce Painting    Relationship: Self       What was the call regarding: NOT FEELING WELL , CRAMPING

## 2023-04-25 ENCOUNTER — DOCUMENTATION (OUTPATIENT)
Dept: PHYSICAL THERAPY | Facility: CLINIC | Age: 53
End: 2023-04-25
Payer: OTHER MISCELLANEOUS

## 2023-04-25 DIAGNOSIS — M25.511 ACUTE PAIN OF RIGHT SHOULDER: Primary | ICD-10-CM

## 2023-04-25 DIAGNOSIS — M54.50 ACUTE RIGHT-SIDED LOW BACK PAIN WITHOUT SCIATICA: ICD-10-CM

## 2023-04-25 DIAGNOSIS — R29.898 WEAKNESS OF RIGHT ARM: ICD-10-CM

## 2023-04-25 NOTE — PROGRESS NOTES
Discharge Summary  Discharge Summary from Physical Therapy Report  [address] [facility] [address]    [place] [address]    Patient: Dulce Painting   : 1970  Diagnosis/ICD-10 Code:  The primary encounter diagnosis was Acute pain of right shoulder. Diagnoses of Weakness of right arm and Acute right-sided low back pain without sciatica were also pertinent to this visit.   Referring practitioner: LACEY Kenney  Date of Initial Visit: Type: THERAPY  Noted: 2022  Today's Date: 2023      Number of Visits: 9     Date of Discharge: 2023    Goals: Partially Met-unable to formally assess at time of d/c    Assessment/Reason for Discharge: Pt called clinic, stating symptoms 100% rsolved and was released to return to work w/o restrictions. No longer feels like she needs PT. Discussed continuing HEP to maximize strength and prevent reinjury. [facility] HEP link texted to pt. D/c'd to independent mgmt.    Access Code: [email or web address]  URL: [email or web address]  Date: 2023  Prepared by: Diana See    Exercises  - Standing Single Arm Elbow Flexion with Resistance  - 1 x daily - 15-30 reps  - Shoulder External Rotation and Scapular Retraction with Resistance  - 1 x daily - 15-30 reps  - Standing Shoulder Horizontal Abduction with Resistance  - 1 x daily - 15-30 reps  - Scaption Wall Slide with Towel  - 1 x daily - 15 reps  - Seated Shoulder External Rotation AAROM with Cane and Hand in Neutral  - 1 x daily - 15 reps    Discharge Plan: Continue with current home exercise program as instructed            Diana See, PT  Physical Therapist

## 2024-05-24 ENCOUNTER — PATIENT ROUNDING (BHMG ONLY) (OUTPATIENT)
Dept: URGENT CARE | Facility: CLINIC | Age: 54
End: 2024-05-24
Payer: COMMERCIAL

## 2024-06-29 ENCOUNTER — PATIENT ROUNDING (BHMG ONLY) (OUTPATIENT)
Dept: URGENT CARE | Facility: CLINIC | Age: 54
End: 2024-06-29
Payer: COMMERCIAL

## 2024-07-22 ENCOUNTER — PATIENT ROUNDING (BHMG ONLY) (OUTPATIENT)
Dept: URGENT CARE | Facility: CLINIC | Age: 54
End: 2024-07-22
Payer: COMMERCIAL

## 2024-10-29 ENCOUNTER — PATIENT ROUNDING (BHMG ONLY) (OUTPATIENT)
Dept: URGENT CARE | Facility: CLINIC | Age: 54
End: 2024-10-29
Payer: COMMERCIAL

## 2025-03-29 ENCOUNTER — TELEPHONE (OUTPATIENT)
Dept: URGENT CARE | Facility: CLINIC | Age: 55
End: 2025-03-29

## 2025-03-29 DIAGNOSIS — J10.1 INFLUENZA A: Primary | ICD-10-CM

## 2025-03-29 RX ORDER — DEXTROMETHORPHAN HYDROBROMIDE AND PROMETHAZINE HYDROCHLORIDE 15; 6.25 MG/5ML; MG/5ML
5 SYRUP ORAL 4 TIMES DAILY PRN
Qty: 118 ML | Refills: 0 | Status: SHIPPED | OUTPATIENT
Start: 2025-03-29

## 2025-03-30 ENCOUNTER — PATIENT ROUNDING (BHMG ONLY) (OUTPATIENT)
Dept: URGENT CARE | Facility: CLINIC | Age: 55
End: 2025-03-30
Payer: COMMERCIAL